# Patient Record
Sex: FEMALE | URBAN - NONMETROPOLITAN AREA
[De-identification: names, ages, dates, MRNs, and addresses within clinical notes are randomized per-mention and may not be internally consistent; named-entity substitution may affect disease eponyms.]

---

## 2018-01-25 ENCOUNTER — OFFICE VISIT (OUTPATIENT)
Dept: MEDICAL GROUP | Facility: CLINIC | Age: 43
End: 2018-01-25
Payer: MEDICAID

## 2018-01-25 ENCOUNTER — NON-PROVIDER VISIT (OUTPATIENT)
Dept: URGENT CARE | Facility: PHYSICIAN GROUP | Age: 43
End: 2018-01-25
Payer: MEDICAID

## 2018-01-25 ENCOUNTER — APPOINTMENT (OUTPATIENT)
Dept: RADIOLOGY | Facility: IMAGING CENTER | Age: 43
End: 2018-01-25
Attending: PHYSICIAN ASSISTANT
Payer: MEDICAID

## 2018-01-25 VITALS
WEIGHT: 270 LBS | SYSTOLIC BLOOD PRESSURE: 130 MMHG | BODY MASS INDEX: 40.92 KG/M2 | RESPIRATION RATE: 16 BRPM | HEIGHT: 68 IN | HEART RATE: 78 BPM | DIASTOLIC BLOOD PRESSURE: 82 MMHG | TEMPERATURE: 98.3 F | OXYGEN SATURATION: 94 %

## 2018-01-25 DIAGNOSIS — J30.89 ALLERGY TO DUST: ICD-10-CM

## 2018-01-25 DIAGNOSIS — Z23 NEED FOR VACCINATION: ICD-10-CM

## 2018-01-25 DIAGNOSIS — Z13.6 SCREENING FOR CARDIOVASCULAR CONDITION: ICD-10-CM

## 2018-01-25 DIAGNOSIS — G89.29 CHRONIC PAIN OF BOTH ANKLES: ICD-10-CM

## 2018-01-25 DIAGNOSIS — M25.571 CHRONIC PAIN OF BOTH ANKLES: ICD-10-CM

## 2018-01-25 DIAGNOSIS — F90.9 ADULT ADHD (ATTENTION DEFICIT HYPERACTIVITY DISORDER): ICD-10-CM

## 2018-01-25 DIAGNOSIS — F41.9 ANXIETY AND DEPRESSION: ICD-10-CM

## 2018-01-25 DIAGNOSIS — M25.572 CHRONIC PAIN OF BOTH ANKLES: ICD-10-CM

## 2018-01-25 DIAGNOSIS — E66.01 MORBID OBESITY WITH BMI OF 40.0-44.9, ADULT (HCC): ICD-10-CM

## 2018-01-25 DIAGNOSIS — E28.2 PCOS (POLYCYSTIC OVARIAN SYNDROME): ICD-10-CM

## 2018-01-25 DIAGNOSIS — F32.A ANXIETY AND DEPRESSION: ICD-10-CM

## 2018-01-25 PROCEDURE — 73620 X-RAY EXAM OF FOOT: CPT | Mod: RT,LT | Performed by: FAMILY MEDICINE

## 2018-01-25 PROCEDURE — 90471 IMMUNIZATION ADMIN: CPT | Performed by: PHYSICIAN ASSISTANT

## 2018-01-25 PROCEDURE — 99204 OFFICE O/P NEW MOD 45 MIN: CPT | Mod: 25 | Performed by: PHYSICIAN ASSISTANT

## 2018-01-25 PROCEDURE — 90686 IIV4 VACC NO PRSV 0.5 ML IM: CPT | Performed by: PHYSICIAN ASSISTANT

## 2018-01-25 RX ORDER — FLUOXETINE HYDROCHLORIDE 20 MG/1
20 CAPSULE ORAL DAILY
Qty: 90 CAP | Refills: 3 | Status: SHIPPED | OUTPATIENT
Start: 2018-01-25

## 2018-01-25 RX ORDER — PARSLEY 450 MG
CAPSULE ORAL
COMMUNITY

## 2018-01-25 RX ORDER — METHYLPHENIDATE HYDROCHLORIDE 20 MG/1
20 CAPSULE, EXTENDED RELEASE ORAL EVERY MORNING
COMMUNITY
End: 2019-06-13

## 2018-01-25 RX ORDER — FLUOXETINE HYDROCHLORIDE 20 MG/1
20 CAPSULE ORAL DAILY
COMMUNITY
End: 2018-01-25 | Stop reason: SDUPTHER

## 2018-01-25 RX ORDER — IBUPROFEN 200 MG
200 TABLET ORAL EVERY 6 HOURS PRN
COMMUNITY

## 2018-01-25 ASSESSMENT — PATIENT HEALTH QUESTIONNAIRE - PHQ9: CLINICAL INTERPRETATION OF PHQ2 SCORE: 0

## 2018-01-25 NOTE — ASSESSMENT & PLAN NOTE
Since moving from [place] to [place], the patient has noted that her allergies have become worse. She states that in the past hydrocortisone topical solution in each ear one drop 3 times per day for 2 days will completely alleviate her symptoms of nasal sinus congestion, ear itching and ear fullness for a couple of weeks. She is requesting this medication today.

## 2018-01-25 NOTE — ASSESSMENT & PLAN NOTE
Patient states that recently she has been gaining weight uncontrollably. She states she has greatly limited the sugar intake in her diet and continues to gain weight. She has not recently had a thyroid or any other blood tests and is requesting this today. Exercises very difficult to squeeze into her busy schedule as she has 4 children.

## 2018-01-25 NOTE — ASSESSMENT & PLAN NOTE
This patient has been taking Ritalin for many years as prescribed by her general practice provider in [place]. She states this medication alleviates her disorganized thought patterns and hyperactive behavior.

## 2018-01-25 NOTE — ASSESSMENT & PLAN NOTE
Since moving from [place] about 3 months ago, the patient has noticed increasing bilateral ankle pain. She states it is worse in the back area of her ankle around the Achilles tendon. She has not had this evaluated. She does not have any swelling or tenderness to palpation however, she states it is worse after sitting for long periods of time and standing up.

## 2018-01-25 NOTE — PROGRESS NOTES
I reviewed foot xrays. You have bone spurs present in both heels which may be the cause of your pain. Please follow up with physical therapy as referred.

## 2018-01-25 NOTE — ASSESSMENT & PLAN NOTE
Patient states in the past few years, she has had uncontrolled weight gain, increase hair growth around her chin and mouth and her nipples and arms. She has developed a fatty lump on her upper back/neck. She continues to have menstrual cycles and has successfully delivered 4 children. She is not constipated and does not have diarrhea. She does not state she has excessive or reduced energy levels.

## 2018-01-25 NOTE — PROGRESS NOTES
Chief Complaint   Patient presents with   • Ankle Pain       HISTORY OF THE PRESENT ILLNESS: This is a 42 y.o. female new patient to our practice who presents today for evaluation and management of:    Adult ADHD (attention deficit hyperactivity disorder)  This patient has been taking Ritalin for many years as prescribed by her general practice provider in [place]. She states this medication alleviates her disorganized thought patterns and hyperactive behavior.    Anxiety and depression  Patient states that her anxiety and depression began after her first pregnancy and childbirth. She states she has been taking Prozac 20 mg capsules daily after every pregnancy and has continued since her now 2-year-old child was born. She feels this keeps her anxiety and depression under good control. She denies suicidal or homicidal ideations at this time.    Chronic pain of both ankles  Since moving from [place] about 3 months ago, the patient has noticed increasing bilateral ankle pain. She states it is worse in the back area of her ankle around the Achilles tendon. She has not had this evaluated. She does not have any swelling or tenderness to palpation however, she states it is worse after sitting for long periods of time and standing up.     Morbid obesity with BMI of 40.0-44.9, adult (HCC)  Patient states that recently she has been gaining weight uncontrollably. She states she has greatly limited the sugar intake in her diet and continues to gain weight. She has not recently had a thyroid or any other blood tests and is requesting this today. Exercises very difficult to squeeze into her busy schedule as she has 4 children.    PCOS (polycystic ovarian syndrome)  Patient states in the past few years, she has had uncontrolled weight gain, increase hair growth around her chin and mouth and her nipples and arms. She has developed a fatty lump on her upper back/neck. She continues to have menstrual cycles and has successfully  delivered 4 children. She is not constipated and does not have diarrhea. She does not state she has excessive or reduced energy levels.    Allergy to dust  Since moving from [place] to [place], the patient has noted that her allergies have become worse. She states that in the past hydrocortisone topical solution in each ear one drop 3 times per day for 2 days will completely alleviate her symptoms of nasal sinus congestion, ear itching and ear fullness for a couple of weeks. She is requesting this medication today.      History reviewed. No pertinent past medical history.    Past Surgical History:   Procedure Laterality Date   • TUBAL LIGATION         Family Status   Relation Status   • Mother Alive   • Father Alive   • Sister Alive   • Brother Alive   • Sister Alive   • Sister Alive     Family History   Problem Relation Age of Onset   • Psychiatry Mother    • Other Mother      fibromyalgia and IBS   • No Known Problems Father    • Psychiatry Sister    • Psychiatry Brother      possible positive suicide attempt   • Alcohol abuse Sister    • Psychiatry Sister        Social History   Substance Use Topics   • Smoking status: Never Smoker   • Smokeless tobacco: Never Used   • Alcohol use No       Allergies: Augmentin and Codeine    Current Outpatient Prescriptions Ordered in [facility]   Medication Sig Dispense Refill   • methylphenidate (RITALIN LA) 20 MG SR capsule Take 20 mg by mouth every morning.     • ibuprofen (MOTRIN) 200 MG Tab Take 200 mg by mouth every 6 hours as needed.     • Ashwagandha 500 MG Cap Take  by mouth.     • fluoxetine (PROZAC) 20 MG Cap Take 1 Cap by mouth every day. 90 Cap 3   • HYDROCORTISONE, TOPICAL, 2.5 % Solution 3 Drops by Apply externally route 3 times a day as needed (itching ear). 30 mL 5     No current [facility]-ordered facility-administered medications on file.      Review of Systems: See history of present illness above.  Constitutional: Negative for fever, chills and malaise/fatigue.  "  HENT: See history of present illness above. Negative for ear pain or tinnitus, nosebleeds,  sore throat and neck pain.    Eyes: Negative for blurred or decreased vision.   Respiratory: Negative for cough, sputum production, shortness of breath and wheezing.    Cardiovascular: Negative for chest pain, palpitations, orthopnea, syncope and leg swelling.   Gastrointestinal: Negative for heartburn, nausea, vomiting and abdominal pain.   Genitourinary: Negative for dysuria, urgency and frequency.   Musculoskeletal: See history of present illness above. Negative for myalgias, back pain.   Skin: See history of present illness above for increased hair growth Negative for rash, itching, nail changes or skin changes.   Neurological: Negative for dizziness, tremors, sensory change, focal weakness, tingling and headaches.   Endo/Heme/Allergies: See history of present illness above. Does not bruise/bleed easily.    Psychiatric/Behavioral: Positive for well-controlled depression. Negative for suicidal ideas and memory loss. The patient is not nervous/anxious and does not have insomnia.  Pt does not use recreational drugs or any alcohol.       Exam:  Blood pressure 130/82, pulse 78, temperature 36.8 °C (98.3 °F), resp. rate 16, height 1.727 m (5' 8\"), weight 122.5 kg (270 lb), SpO2 94 %.   Calculated BMI is 41.05  General:  Obese female in NAD  Eyes: Conjunctiva clear, lids without ptosis, pupils equal and reactive to light accommodation.  ENMT: Nose and lips without deformity. Nasal mucosa and septum pink without evidence of purulent drainage.  Neck: Supple without masses upon palpation. Thyroid is not visibly enlarged although it is slightly enlarged to palpation. Small [place] hump present.  Pulmonary: Normal effort. No rales, ronchi, or wheezing upon auscultation.   Cardiovascular: Regular rate and rhythm without murmur. Carotid and radial pulses are intact and equal bilaterally.   Extremities: No clubbing or cyanosis. " Bilateral upper and lower extremities without edema.   GI: Protuberant abdomen with central obesity. Normoactive bowel sounds in all 4 quadrants. Soft, nontender, nondistended. Without palpable hepatosplenomegaly.   Neurologic: Deep tendon reflexes 2+/4 bilaterally.   Skin: Warm and dry.  No obvious lesions. Patient does have trimmed hair on her chin, upper lip and excessive hair growth on her bilateral forearms for a female.  Musculoskeletal: Normal gait.   Psych: Normal mood and affect. Alert and oriented x3. Judgment and insight is normal.      Medical Decision Making & Discussion:   1. Chronic pain of both ankles  - DX-FOOT-2- LEFT; Future  - DX-FOOT-2- RIGHT; Future  - REFERRAL TO PHYSICAL THERAPY Reason for Therapy: Eval/Treat/Report    2. Morbid obesity with BMI of 40.0-44.9, adult (CMS-[facility])  - Patient identified as having weight management issue.  Appropriate orders and counseling given.  - CMP (12)    3. PCOS (polycystic ovarian syndrome)  Although this diagnosis is uncertain at this time, based on the patient's history it seems this is likely. The one curious aspect is that she has been able to deliver multiple children continues having menstrual cycles.  - TSH+FREE T4  - TESTOSTERONE F&T FEMALES/CHILD; Future  - CORTISOL, SERUM OR PLASMA  - PROLACTIN; Future  - CMP (12)    4. Anxiety and depression  - fluoxetine (PROZAC) 20 MG Cap; Take 1 Cap by mouth every day.  Dispense: 90 Cap; Refill: 3  - CMP (12)    5. Adult ADHD (attention deficit hyperactivity disorder)  Patient was advised to see a psychiatrist per her Ritalin prescription. After she has been seen by psychiatry for 2 or 3 visits, I am happy to take over prescription of this medication once I have received record of multiple psychiatry visits without a medication change.  - REFERRAL TO PSYCHIATRY    6. Need for vaccination  - Flu Quad Inj >3 Year Pre-Filled PF    7. Allergy to dust  - HYDROCORTISONE, TOPICAL, 2.5 % Solution; 3 Drops by Apply  externally route 3 times a day as needed (itching ear).  Dispense: 30 mL; Refill: 5    8. Screening for cardiovascular condition  - LIPID PANEL        Please note that this dictation was created using voice recognition software. I have made every reasonable attempt to correct obvious errors, but I expect that there are errors of grammar and possibly content that I did not discover before finalizing the note.    Return in about 4 weeks (around 2/22/2018) for PCOS and lab follow up.

## 2018-01-25 NOTE — ASSESSMENT & PLAN NOTE
Patient states that her anxiety and depression began after her first pregnancy and childbirth. She states she has been taking Prozac 20 mg capsules daily after every pregnancy and has continued since her now 2-year-old child was born. She feels this keeps her anxiety and depression under good control. She denies suicidal or homicidal ideations at this time.

## 2018-01-26 ENCOUNTER — HOSPITAL ENCOUNTER (OUTPATIENT)
Facility: MEDICAL CENTER | Age: 43
End: 2018-01-26
Attending: PHYSICIAN ASSISTANT
Payer: MEDICAID

## 2018-01-26 ENCOUNTER — NON-PROVIDER VISIT (OUTPATIENT)
Dept: MEDICAL GROUP | Facility: CLINIC | Age: 43
End: 2018-01-26
Payer: MEDICAID

## 2018-01-26 DIAGNOSIS — E28.2 PCOS (POLYCYSTIC OVARIAN SYNDROME): ICD-10-CM

## 2018-01-26 DIAGNOSIS — Z01.89 ROUTINE LAB DRAW: ICD-10-CM

## 2018-01-26 LAB
ALBUMIN SERPL BCP-MCNC: 4.6 G/DL (ref 3.2–4.9)
ALBUMIN/GLOB SERPL: 1.6 G/DL
ALP SERPL-CCNC: 70 U/L (ref 30–99)
ALT SERPL-CCNC: 10 U/L (ref 2–50)
ANION GAP SERPL CALC-SCNC: 7 MMOL/L (ref 0–11.9)
AST SERPL-CCNC: 18 U/L (ref 12–45)
BILIRUB SERPL-MCNC: 1 MG/DL (ref 0.1–1.5)
BUN SERPL-MCNC: 14 MG/DL (ref 8–22)
CALCIUM SERPL-MCNC: 8.8 MG/DL (ref 8.5–10.5)
CHLORIDE SERPL-SCNC: 101 MMOL/L (ref 96–112)
CHOLEST SERPL-MCNC: 137 MG/DL (ref 100–199)
CO2 SERPL-SCNC: 27 MMOL/L (ref 20–33)
CORTIS SERPL-MCNC: 13.2 UG/DL (ref 0–23)
CREAT SERPL-MCNC: 0.75 MG/DL (ref 0.5–1.4)
GLOBULIN SER CALC-MCNC: 2.8 G/DL (ref 1.9–3.5)
GLUCOSE SERPL-MCNC: 90 MG/DL (ref 65–99)
HDLC SERPL-MCNC: 37 MG/DL
LDLC SERPL CALC-MCNC: 60 MG/DL
POTASSIUM SERPL-SCNC: 4.5 MMOL/L (ref 3.6–5.5)
PROLACTIN SERPL-MCNC: 4.97 NG/ML (ref 2.8–26)
PROT SERPL-MCNC: 7.4 G/DL (ref 6–8.2)
SODIUM SERPL-SCNC: 135 MMOL/L (ref 135–145)
T4 FREE SERPL-MCNC: 0.75 NG/DL (ref 0.53–1.43)
TRIGL SERPL-MCNC: 201 MG/DL (ref 0–149)
TSH SERPL DL<=0.005 MIU/L-ACNC: 1.47 UIU/ML (ref 0.38–5.33)

## 2018-01-26 PROCEDURE — 82533 TOTAL CORTISOL: CPT

## 2018-01-26 PROCEDURE — 99000 SPECIMEN HANDLING OFFICE-LAB: CPT | Performed by: NURSE PRACTITIONER

## 2018-01-26 PROCEDURE — 84403 ASSAY OF TOTAL TESTOSTERONE: CPT

## 2018-01-26 PROCEDURE — 84270 ASSAY OF SEX HORMONE GLOBUL: CPT

## 2018-01-26 PROCEDURE — 80053 COMPREHEN METABOLIC PANEL: CPT

## 2018-01-26 PROCEDURE — 84443 ASSAY THYROID STIM HORMONE: CPT

## 2018-01-26 PROCEDURE — 36415 COLL VENOUS BLD VENIPUNCTURE: CPT | Performed by: NURSE PRACTITIONER

## 2018-01-26 PROCEDURE — 84146 ASSAY OF PROLACTIN: CPT

## 2018-01-26 PROCEDURE — 80061 LIPID PANEL: CPT

## 2018-01-26 PROCEDURE — 84439 ASSAY OF FREE THYROXINE: CPT

## 2018-01-31 ENCOUNTER — TELEPHONE (OUTPATIENT)
Dept: MEDICAL GROUP | Facility: CLINIC | Age: 43
End: 2018-01-31

## 2018-01-31 LAB
SHBG SERPL-SCNC: 28 NMOL/L (ref 30–135)
TESTOST FREE SERPL-MCNC: 4.5 PG/ML (ref 1.3–9.2)
TESTOST SERPL-MCNC: 25 NG/DL (ref 9–55)

## 2018-01-31 NOTE — TELEPHONE ENCOUNTER
Pt notified of results. She's wondering if you recommend going back on birth control or are there any recommendations for controlling or regularizing hormone levels ? she's curious about spironolactone or potentially metformin ?

## 2018-01-31 NOTE — PROGRESS NOTES
I reviewed labs. Your hormone tests are only slightly abnormal and at this point are inconclusive regarding PCOS. If you had a free testosterone level that was in the upper limit of normal or elevated I would be more convinced that this is what is causing your symptoms. However, with only one factor that points to PCOS, we should be a bit more cautious in concluding that this is the problem.

## 2018-01-31 NOTE — TELEPHONE ENCOUNTER
----- Message from Dunia Lee P.A.-C. sent at 1/31/2018  8:24 AM PST -----  I reviewed labs. Your hormone tests are only slightly abnormal and at this point are inconclusive regarding PCOS. If you had a free testosterone level that was in the upper limit of normal or elevated I would be more convinced that this is what is causing your symptoms. However, with only one factor that points to PCOS, we should be a bit more cautious in concluding that this is the problem.

## 2018-02-02 NOTE — TELEPHONE ENCOUNTER
Dunia Lee P.A.-C.   Smitha Abad; [facility] 22 hours ago (1:07 PM)      This patient should return for a follow up visit to discuss.  (Routing comment)      Pt. With apt. On 2/27/18

## 2018-02-27 ENCOUNTER — OFFICE VISIT (OUTPATIENT)
Dept: MEDICAL GROUP | Facility: CLINIC | Age: 43
End: 2018-02-27
Payer: MEDICAID

## 2018-02-27 VITALS
HEART RATE: 79 BPM | BODY MASS INDEX: 40.92 KG/M2 | OXYGEN SATURATION: 99 % | TEMPERATURE: 99.2 F | SYSTOLIC BLOOD PRESSURE: 130 MMHG | HEIGHT: 68 IN | RESPIRATION RATE: 16 BRPM | WEIGHT: 270 LBS | DIASTOLIC BLOOD PRESSURE: 84 MMHG

## 2018-02-27 DIAGNOSIS — L68.0 EXCESSIVE HAIR ON FEMALES: ICD-10-CM

## 2018-02-27 DIAGNOSIS — E66.01 MORBID OBESITY WITH BMI OF 40.0-44.9, ADULT (HCC): ICD-10-CM

## 2018-02-27 PROBLEM — E28.2 PCOS (POLYCYSTIC OVARIAN SYNDROME): Status: RESOLVED | Noted: 2018-01-25 | Resolved: 2018-02-27

## 2018-02-27 PROCEDURE — 99213 OFFICE O/P EST LOW 20 MIN: CPT | Performed by: PHYSICIAN ASSISTANT

## 2018-02-27 NOTE — ASSESSMENT & PLAN NOTE
Patient states that recently she has been gaining weight uncontrollably. She states she has greatly limited the sugar intake in her diet and continues to gain weight. Exercise is very difficult to squeeze into her busy schedule as she has 4 children. She has recently started dieting to reduce her calorie intake. Her Thyroid, cortisol and all other lab tests were wnl except a sightly low sex hormone binding globulin which is not indicative of any particular disease process.

## 2018-02-27 NOTE — PROGRESS NOTES
Chief Complaint   Patient presents with   • Ankle Pain       HISTORY OF PRESENT ILLNESS: Patient is a 42 y.o. female established patient who presents today for evaluation and management of:    Morbid obesity with BMI of 40.0-44.9, adult ([facility])  Patient states that recently she has been gaining weight uncontrollably. She states she has greatly limited the sugar intake in her diet and continues to gain weight. Exercise is very difficult to squeeze into her busy schedule as she has 4 children. She has recently started dieting to reduce her calorie intake. Her Thyroid, cortisol and all other lab tests were wnl except a sightly low sex hormone binding globulin which is not indicative of any particular disease process.     Excessive hair on females  Patient continues to have excessive hair growth on her chin that she is worried about.        Patient Active Problem List    Diagnosis Date Noted   • Excessive hair on females 02/27/2018   • Chronic pain of both ankles 01/25/2018   • Morbid obesity with BMI of 40.0-44.9, adult ([facility]) 01/25/2018   • Anxiety and depression 01/25/2018   • Adult ADHD (attention deficit hyperactivity disorder) 01/25/2018   • Allergy to dust 01/25/2018       Allergies:Augmentin and Codeine    Current Outpatient Prescriptions   Medication Sig Dispense Refill   • methylphenidate (RITALIN LA) 20 MG SR capsule Take 20 mg by mouth every morning.     • Ashwagandha 500 MG Cap Take  by mouth.     • fluoxetine (PROZAC) 20 MG Cap Take 1 Cap by mouth every day. 90 Cap 3   • ibuprofen (MOTRIN) 200 MG Tab Take 200 mg by mouth every 6 hours as needed.     • HYDROCORTISONE, TOPICAL, 2.5 % Solution 3 Drops by Apply externally route 3 times a day as needed (itching ear). 30 mL 5     No current facility-administered medications for this visit.        Social History   Substance Use Topics   • Smoking status: Never Smoker   • Smokeless tobacco: Never Used   • Alcohol use No       Family Status   Relation Status   •  "Mother Alive   • Father Alive   • Sister Alive   • Brother Alive   • Sister Alive   • Sister Alive   • Child Alive   • Child Alive   • Child Alive   • Child Alive     Family History   Problem Relation Age of Onset   • Psychiatry Mother    • Other Mother      fibromyalgia and IBS   • No Known Problems Father    • Psychiatry Sister    • Psychiatry Brother      possible positive suicide attempt   • Alcohol abuse Sister    • Psychiatry Sister    • Depression Child    • ADD / ADHD Child        Review of Systems:   Constitutional: Negative for fever, chills, weight loss and positive for fatigue.   HENT: Negative for ear pain, nosebleeds, congestion, sore throat and neck pain.    Eyes: Negative for blurred vision.   Respiratory: Negative for cough, sputum production, shortness of breath and wheezing.    Cardiovascular: Negative for chest pain, palpitations, orthopnea and leg swelling.   Gastrointestinal: Negative for heartburn, nausea, vomiting and abdominal pain.   Genitourinary: Negative for dysuria, urgency and frequency.   Musculoskeletal: Negative for myalgias, back pain.  Skin: Negative for rash and itching.   Neurological: Negative for dizziness, tingling, tremors, sensory change, focal weakness and headaches.   Endo/Heme/Allergies: Does not bruise/bleed easily.   Psychiatric/Behavioral: Negative for depression, suicidal ideas and memory loss.  The patient is not nervous/anxious and does not have insomnia.      Exam:  Blood pressure 130/84, pulse 79, temperature 37.3 °C (99.2 °F), resp. rate 16, height 1.727 m (5' 8\"), weight 122.5 kg (270 lb), SpO2 99 %.  Body mass index is 41.05 kg/m².  General:  Obese female in NAD  Head: is grossly normal.  Neck: Supple without masses. Thyroid is not visibly enlarged.  Pulmonary: Clear to ausculation. Normal effort. No rales, ronchi, or wheezing.  Cardiovascular: Regular rate and rhythm without murmur. Carotid and radial pulses are intact and equal bilaterally.  Extremities: no " clubbing, cyanosis, or edema.    Medical decision-making and discussion:  1. Excessive hair on females  Paticaren was advised that there is no current clear cause for this except that her normal female hormones are declining with age.     2. Morbid obesity with BMI of 40.0-44.9, adult (HCC)  Patient advised for at least 10 minutes regarding how to reduce calories and lose weight.       Please note that this dictation was created using voice recognition software. I have made every reasonable attempt to correct obvious errors, but I expect that there are errors of grammar and possibly content that I did not discover before finalizing the note.      Return if symptoms worsen or fail to improve.

## 2018-10-30 ENCOUNTER — OFFICE VISIT (OUTPATIENT)
Dept: URGENT CARE | Facility: PHYSICIAN GROUP | Age: 43
End: 2018-10-30
Payer: MEDICAID

## 2018-10-30 VITALS
HEIGHT: 68 IN | BODY MASS INDEX: 38.95 KG/M2 | TEMPERATURE: 97.7 F | HEART RATE: 63 BPM | RESPIRATION RATE: 16 BRPM | SYSTOLIC BLOOD PRESSURE: 140 MMHG | DIASTOLIC BLOOD PRESSURE: 80 MMHG | OXYGEN SATURATION: 97 % | WEIGHT: 257 LBS

## 2018-10-30 DIAGNOSIS — J02.9 SORE THROAT: ICD-10-CM

## 2018-10-30 DIAGNOSIS — J06.9 URI WITH COUGH AND CONGESTION: ICD-10-CM

## 2018-10-30 LAB
INT CON NEG: NORMAL
INT CON POS: NORMAL
S PYO AG THROAT QL: NEGATIVE

## 2018-10-30 PROCEDURE — 87880 STREP A ASSAY W/OPTIC: CPT | Performed by: PHYSICIAN ASSISTANT

## 2018-10-30 PROCEDURE — 99204 OFFICE O/P NEW MOD 45 MIN: CPT | Performed by: PHYSICIAN ASSISTANT

## 2018-10-30 RX ORDER — FLUTICASONE PROPIONATE 50 MCG
1 SPRAY, SUSPENSION (ML) NASAL 2 TIMES DAILY
Qty: 1 BOTTLE | Refills: 0 | Status: SHIPPED | OUTPATIENT
Start: 2018-10-30 | End: 2019-02-19 | Stop reason: SDUPTHER

## 2018-10-30 NOTE — PROGRESS NOTES
Chief Complaint   Patient presents with   • Cough       HISTORY OF PRESENT ILLNESS: Patient is a 42 y.o. female who presents today for the following:    Cough x 3 weeks off and on  Will go away for a few days then come back  Most recently, sx's started about 2 days ago  + Lymph nodes are swollen, ST, body aches, fever  1 kid in school  OTC meds today: APAP, ibuprofen, last dosed about 3 hours PTA     Patient Active Problem List    Diagnosis Date Noted   • Excessive hair on females 02/27/2018   • Chronic pain of both ankles 01/25/2018   • Morbid obesity with BMI of 40.0-44.9, adult ([facility]) 01/25/2018   • Anxiety and depression 01/25/2018   • Adult ADHD (attention deficit hyperactivity disorder) 01/25/2018   • Allergy to dust 01/25/2018       Allergies:Augmentin and Codeine    Current Outpatient Prescriptions Ordered in [facility]   Medication Sig Dispense Refill   • fluticasone (FLONASE) 50 MCG/ACT nasal spray Spray 1 Spray in nose 2 times a day. 1 Bottle 0   • methylphenidate (RITALIN LA) 20 MG SR capsule Take 20 mg by mouth every morning.     • ibuprofen (MOTRIN) 200 MG Tab Take 200 mg by mouth every 6 hours as needed.     • Ashwagandha 500 MG Cap Take  by mouth.     • fluoxetine (PROZAC) 20 MG Cap Take 1 Cap by mouth every day. 90 Cap 3   • HYDROCORTISONE, TOPICAL, 2.5 % Solution 3 Drops by Apply externally route 3 times a day as needed (itching ear). 30 mL 5     No current [facility]-ordered facility-administered medications on file.        No past medical history on file.    Social History   Substance Use Topics   • Smoking status: Never Smoker   • Smokeless tobacco: Never Used   • Alcohol use No       Family Status   Relation Status   • Mo Alive   • Fa Alive   • Sis Alive   • Bro Alive   • Sis Alive   • Sis Alive   • Child Alive   • Child Alive   • Child Alive   • Child Alive     Family History   Problem Relation Age of Onset   • Psychiatry Mother    • Other Mother         fibromyalgia and IBS   • No Known Problems  "Father    • Psychiatry Sister    • Psychiatry Brother         possible positive suicide attempt   • Alcohol abuse Sister    • Psychiatry Sister    • Depression Child    • ADD / ADHD Child        Review of Systems:   Constitutional ROS: No unexpected change in weight, No weakness, No fatigue  Eye ROS: No recent significant change in vision, No eye pain, redness, discharge  Ear ROS: No drainage, No tinnitus or vertigo, No recent change in hearing  Mouth/Throat ROS: No teeth or gum problems, No bleeding gums, No tongue complaints  Neck ROS: No swollen glands, No significant pain in neck  Pulmonary ROS: No chronic cough, sputum, or hemoptysis, No dyspnea on exertion, No wheezing  Cardiovascular ROS: No diaphoresis, No edema, No palpitations  Gastrointestinal ROS: No change in bowel habits, No significant change in appetite, No nausea, vomiting, diarrhea, or constipation  Musculoskeletal/Extremities ROS: No peripheral edema, No pain, redness or swelling on the joints  Skin/Integumentary ROS: No edema, No evidence of rash, No itching      Exam:  Blood pressure 140/80, pulse 63, temperature 36.5 °C (97.7 °F), resp. rate 16, height 1.727 m (5' 8\"), weight 116.6 kg (257 lb), SpO2 97 %.  General: Well developed, well nourished. No distress.  Nontoxic in appearance.  Eye: PERRL/EOMI; conjunctivae clear, lids normal.  ENMT: Lips without lesions, MMM. Oropharynx is clear. Bilateral TMs are within normal limits but with clear fluid posteriorly bilaterally.  Pulmonary: Unlabored respiratory effort. Lungs clear to auscultation, no wheezes, no rhonchi.  Cardiovascular: Regular rate and rhythm without murmur.   Neurologic: Grossly nonfocal. No facial asymmetry noted.  Lymph: Mildly tender cervical lymphadenopathy noted bilaterally.  Skin: Warm, dry, good turgor. No rashes in visible areas.   Psych: Normal mood. Alert and oriented x3. Judgment and insight is normal.    Rapid strep: Negative    Assessment/Plan:  Discussed likely viral " etiology. Discussed appropriate over-the-counter symptomatic medication, and when to return to clinic.  Use all medication as directed.  Follow-up for worsening or persistent symptoms.  1. URI with cough and congestion  fluticasone (FLONASE) 50 MCG/ACT nasal spray   2. Sore throat  POCT Rapid Strep A

## 2018-11-13 ENCOUNTER — OFFICE VISIT (OUTPATIENT)
Dept: URGENT CARE | Facility: PHYSICIAN GROUP | Age: 43
End: 2018-11-13
Payer: MEDICAID

## 2018-11-13 VITALS
WEIGHT: 259.4 LBS | SYSTOLIC BLOOD PRESSURE: 138 MMHG | HEART RATE: 75 BPM | RESPIRATION RATE: 16 BRPM | BODY MASS INDEX: 39.44 KG/M2 | OXYGEN SATURATION: 97 % | TEMPERATURE: 99.7 F | DIASTOLIC BLOOD PRESSURE: 82 MMHG

## 2018-11-13 DIAGNOSIS — Z20.818 STREP THROAT EXPOSURE: ICD-10-CM

## 2018-11-13 LAB
INT CON NEG: NORMAL
INT CON POS: NORMAL
S PYO AG THROAT QL: NEGATIVE

## 2018-11-13 PROCEDURE — 87880 STREP A ASSAY W/OPTIC: CPT | Performed by: PHYSICIAN ASSISTANT

## 2018-11-13 PROCEDURE — 99212 OFFICE O/P EST SF 10 MIN: CPT | Performed by: PHYSICIAN ASSISTANT

## 2018-11-13 NOTE — LETTER
November 13, 2018         Patient: Josefina Calvin   YOB: 1975   Date of Visit: 11/13/2018            To Whom it May Concern:    Josefina Calvin was seen in my clinic on 11/13/2018. She may return to work on 11/15/18.    If you have any questions or concerns, please don't hesitate to call.        Sincerely,           Ev Acevedo P.A.-C.  Electronically Signed

## 2018-11-13 NOTE — PROGRESS NOTES
Chief Complaint   Patient presents with   • Pharyngitis       HISTORY OF PRESENT ILLNESS: Patient is a 42 y.o. female who presents today for the following:    Patient comes in for evaluation of possible strep throat.  Patient had exposure to somebody who was positive for strep.  She complains of very mild body aches and a headache over the last several days.  She states she was feeling a lot worse last week but does continue to have mild symptoms.  She denies any fever, night sweats, chills.  She has not taken any over-the-counter medication today.    Patient Active Problem List    Diagnosis Date Noted   • Excessive hair on females 02/27/2018   • Chronic pain of both ankles 01/25/2018   • Morbid obesity with BMI of 40.0-44.9, adult (HCC) 01/25/2018   • Anxiety and depression 01/25/2018   • Adult ADHD (attention deficit hyperactivity disorder) 01/25/2018   • Allergy to dust 01/25/2018       Allergies:Augmentin and Codeine    Current Outpatient Prescriptions Ordered in [facility]   Medication Sig Dispense Refill   • fluticasone (FLONASE) 50 MCG/ACT nasal spray Spray 1 Spray in nose 2 times a day. 1 Bottle 0   • methylphenidate (RITALIN LA) 20 MG SR capsule Take 20 mg by mouth every morning.     • ibuprofen (MOTRIN) 200 MG Tab Take 200 mg by mouth every 6 hours as needed.     • Ashwagandha 500 MG Cap Take  by mouth.     • fluoxetine (PROZAC) 20 MG Cap Take 1 Cap by mouth every day. 90 Cap 3   • HYDROCORTISONE, TOPICAL, 2.5 % Solution 3 Drops by Apply externally route 3 times a day as needed (itching ear). 30 mL 5     No current [facility]-ordered facility-administered medications on file.        No past medical history on file.    Social History   Substance Use Topics   • Smoking status: Never Smoker   • Smokeless tobacco: Never Used   • Alcohol use No       Family Status   Relation Status   • Mo Alive   • Fa Alive   • Sis Alive   • Bro Alive   • Sis Alive   • Sis Alive   • Child Alive   • Child Alive   • Child Alive   • Child  Alive     Family History   Problem Relation Age of Onset   • Psychiatry Mother    • Other Mother         fibromyalgia and IBS   • No Known Problems Father    • Psychiatry Sister    • Psychiatry Brother         possible positive suicide attempt   • Alcohol abuse Sister    • Psychiatry Sister    • Depression Child    • ADD / ADHD Child        Review of Systems:  Constitutional ROS: No unexpected change in weight, No weakness, No fatigue  Eye ROS: No recent significant change in vision, No eye pain, redness, discharge  Ear ROS: No drainage, No tinnitus or vertigo, No recent change in hearing  Mouth/Throat ROS: No teeth or gum problems, No bleeding gums, No tongue complaints  Neck ROS: No swollen glands, No significant pain in neck  Pulmonary ROS: No chronic cough, sputum, or hemoptysis, No dyspnea on exertion, No wheezing  Cardiovascular ROS: No diaphoresis, No edema, No palpitations  Gastrointestinal ROS: No change in bowel habits, No significant change in appetite, No nausea, vomiting, diarrhea, or constipation  Musculoskeletal/Extremities ROS: No peripheral edema, No pain, redness or swelling on the joints  Skin/Integumentary ROS: No edema, No evidence of rash, No itching      Exam:  Blood pressure 138/82, pulse 75, temperature 37.6 °C (99.7 °F), resp. rate 16, weight 117.7 kg (259 lb 6.4 oz), SpO2 97 %.  General: Well developed, well nourished. No distress.  Eye: PERRL/EOMI; conjunctivae clear, lids normal.  ENMT: Lips without lesions, MMM. Oropharynx is clear. Bilateral TMs are within normal limits.  Pulmonary: Unlabored respiratory effort. Lungs clear to auscultation, no wheezes, no rhonchi.  Cardiovascular: Regular rate and rhythm without murmur.    Neurologic: Grossly nonfocal. No facial asymmetry noted.  Lymph: No cervical lymphadenopathy noted.  Skin: Warm, dry, good turgor. No rashes in visible areas.   Psych: Normal mood. Alert and oriented x3. Judgment and insight is normal.    Rapid strep:  Negative    Assessment/Plan:  Discussed likely viral etiology. Discussed appropriate over-the-counter symptomatic medication, and when to return to clinic.  Follow-up for worsening or persistent symptoms.  1. Strep throat exposure  POCT Rapid Strep A

## 2018-11-15 ENCOUNTER — OFFICE VISIT (OUTPATIENT)
Dept: URGENT CARE | Facility: PHYSICIAN GROUP | Age: 43
End: 2018-11-15
Payer: MEDICAID

## 2018-11-15 VITALS
TEMPERATURE: 97.9 F | SYSTOLIC BLOOD PRESSURE: 126 MMHG | WEIGHT: 259 LBS | OXYGEN SATURATION: 97 % | HEART RATE: 72 BPM | DIASTOLIC BLOOD PRESSURE: 90 MMHG | HEIGHT: 68 IN | RESPIRATION RATE: 16 BRPM | BODY MASS INDEX: 39.25 KG/M2

## 2018-11-15 DIAGNOSIS — J02.0 STREP THROAT: ICD-10-CM

## 2018-11-15 DIAGNOSIS — J02.9 SORE THROAT: ICD-10-CM

## 2018-11-15 DIAGNOSIS — B37.9 ANTIBIOTIC-INDUCED YEAST INFECTION: ICD-10-CM

## 2018-11-15 DIAGNOSIS — T36.95XA ANTIBIOTIC-INDUCED YEAST INFECTION: ICD-10-CM

## 2018-11-15 LAB
INT CON NEG: NEGATIVE
INT CON POS: POSITIVE
S PYO AG THROAT QL: POSITIVE

## 2018-11-15 PROCEDURE — 99214 OFFICE O/P EST MOD 30 MIN: CPT | Performed by: PHYSICIAN ASSISTANT

## 2018-11-15 PROCEDURE — 87880 STREP A ASSAY W/OPTIC: CPT | Performed by: PHYSICIAN ASSISTANT

## 2018-11-15 RX ORDER — AZITHROMYCIN 250 MG/1
TABLET, FILM COATED ORAL
Qty: 6 TAB | Refills: 0 | Status: SHIPPED | OUTPATIENT
Start: 2018-11-15 | End: 2019-03-26

## 2018-11-15 RX ORDER — FLUCONAZOLE 150 MG/1
150 TABLET ORAL DAILY
Qty: 1 TAB | Refills: 0 | Status: SHIPPED | OUTPATIENT
Start: 2018-11-15 | End: 2019-03-26

## 2018-11-15 NOTE — PROGRESS NOTES
Chief Complaint   Patient presents with   • Pharyngitis       HISTORY OF PRESENT ILLNESS: Patient is a 42 y.o. female who presents today because she has a sore throat, feels achy.  Her symptoms have been waxing and waning for the last couple months but over the last several days she has gotten much worse with a sore throat.  Recently her oldest son was diagnosed with strep positive pharyngitis, she had a recent strep test that was negative.  She has been using over-the-counter Tylenol without improvement    Patient Active Problem List    Diagnosis Date Noted   • Excessive hair on females 02/27/2018   • Chronic pain of both ankles 01/25/2018   • Morbid obesity with BMI of 40.0-44.9, adult ([facility]) 01/25/2018   • Anxiety and depression 01/25/2018   • Adult ADHD (attention deficit hyperactivity disorder) 01/25/2018   • Allergy to dust 01/25/2018       Allergies:Augmentin and Codeine    Current Outpatient Prescriptions Ordered in [facility]   Medication Sig Dispense Refill   • azithromycin (ZITHROMAX) 250 MG Tab Follow package directions 6 Tab 0   • fluticasone (FLONASE) 50 MCG/ACT nasal spray Spray 1 Spray in nose 2 times a day. 1 Bottle 0   • methylphenidate (RITALIN LA) 20 MG SR capsule Take 20 mg by mouth every morning.     • ibuprofen (MOTRIN) 200 MG Tab Take 200 mg by mouth every 6 hours as needed.     • Ashwagandha 500 MG Cap Take  by mouth.     • fluoxetine (PROZAC) 20 MG Cap Take 1 Cap by mouth every day. 90 Cap 3   • HYDROCORTISONE, TOPICAL, 2.5 % Solution 3 Drops by Apply externally route 3 times a day as needed (itching ear). 30 mL 5     No current [facility]-ordered facility-administered medications on file.        No past medical history on file.    Social History   Substance Use Topics   • Smoking status: Never Smoker   • Smokeless tobacco: Never Used   • Alcohol use No       Family Status   Relation Status   • Mo Alive   • Fa Alive   • Sis Alive   • Bro Alive   • Sis Alive   • Sis Alive   • Child Alive   • Child Alive  "  • Child Alive   • Child Alive     Family History   Problem Relation Age of Onset   • Psychiatry Mother    • Other Mother         fibromyalgia and IBS   • No Known Problems Father    • Psychiatry Sister    • Psychiatry Brother         possible positive suicide attempt   • Alcohol abuse Sister    • Psychiatry Sister    • Depression Child    • ADD / ADHD Child        ROS:  Review of Systems   Constitutional: Negative for fever, chills, weight loss and positive for malaise/fatigue.   HENT: Negative for ear pain, nosebleeds, congestion, positive for sore throat and neck pain.    Eyes: Negative for blurred vision.   Respiratory: Negative for cough, sputum production, shortness of breath and wheezing.    Cardiovascular: Negative for chest pain, palpitations, orthopnea and leg swelling.   Gastrointestinal: Negative for heartburn, nausea, vomiting and abdominal pain.   Genitourinary: Negative for dysuria, urgency and frequency.     Exam:  Blood pressure 126/90, pulse 72, temperature 36.6 °C (97.9 °F), temperature source Temporal, resp. rate 16, height 1.727 m (5' 8\"), weight 117.5 kg (259 lb), SpO2 97 %.  General:  Well nourished, well developed female in NAD  Head:Normocephalic, atraumatic  Eyes: PERRLA, EOM within normal limits, no conjunctival injection, no scleral icterus, visual fields and acuity grossly intact.  Ears: Normal shape and symmetry, no tenderness, no discharge. External canals are without any significant edema or erythema. Tympanic membranes are without any inflammation, no effusion. Gross auditory acuity is intact  Nose: Symmetrical without tenderness, no discharge.  Mouth: reasonable hygiene, she has pharyngeal arch erythema without exudates or tonsillar enlargement.  Neck: There is mild bilateral anterior cervical lymph node enlargement and tenderness, range of motion within normal limits, no tracheal deviation. No obvious thyroid enlargement.  Pulmonary: chest is symmetrical with respiration, no " wheezes, crackles, or rhonchi.  Cardiovascular: regular rate and rhythm without murmurs, rubs, or gallops.  Extremities: no clubbing, cyanosis, or edema.    Strep test is positive    Please note that this dictation was created using voice recognition software. I have made every reasonable attempt to correct obvious errors, but I expect that there are errors of grammar and possibly content that I did not discover before finalizing the note.    Assessment/Plan:  1. Strep throat  azithromycin (ZITHROMAX) 250 MG Tab   2. Sore throat  POCT Rapid Strep A   Tylenol or ibuprofen as tolerated.    Followup with primary care in the next 7-10 days if not significantly improving, return to the urgent care or go to the emergency room sooner for any worsening of symptoms.

## 2019-02-19 ENCOUNTER — OFFICE VISIT (OUTPATIENT)
Dept: MEDICAL GROUP | Facility: CLINIC | Age: 44
End: 2019-02-19
Payer: MEDICAID

## 2019-02-19 VITALS
HEIGHT: 69 IN | DIASTOLIC BLOOD PRESSURE: 84 MMHG | OXYGEN SATURATION: 98 % | BODY MASS INDEX: 38.51 KG/M2 | TEMPERATURE: 99.2 F | HEART RATE: 72 BPM | SYSTOLIC BLOOD PRESSURE: 164 MMHG | WEIGHT: 260 LBS | RESPIRATION RATE: 16 BRPM

## 2019-02-19 DIAGNOSIS — F41.9 ANXIETY AND DEPRESSION: ICD-10-CM

## 2019-02-19 DIAGNOSIS — F32.A ANXIETY AND DEPRESSION: ICD-10-CM

## 2019-02-19 DIAGNOSIS — R79.89 ABNORMAL LEVEL OF FEMALE SEX HORMONES: ICD-10-CM

## 2019-02-19 DIAGNOSIS — L68.0 EXCESSIVE HAIR ON FEMALES: ICD-10-CM

## 2019-02-19 DIAGNOSIS — I10 ESSENTIAL HYPERTENSION: ICD-10-CM

## 2019-02-19 DIAGNOSIS — E66.09 CLASS 2 OBESITY DUE TO EXCESS CALORIES WITHOUT SERIOUS COMORBIDITY WITH BODY MASS INDEX (BMI) OF 38.0 TO 38.9 IN ADULT: ICD-10-CM

## 2019-02-19 DIAGNOSIS — J30.89 ALLERGY TO DUST: ICD-10-CM

## 2019-02-19 DIAGNOSIS — J06.9 URI WITH COUGH AND CONGESTION: ICD-10-CM

## 2019-02-19 PROCEDURE — 99214 OFFICE O/P EST MOD 30 MIN: CPT | Performed by: PHYSICIAN ASSISTANT

## 2019-02-19 RX ORDER — FLUTICASONE PROPIONATE 50 MCG
1 SPRAY, SUSPENSION (ML) NASAL 2 TIMES DAILY
Qty: 1 BOTTLE | Refills: 0 | Status: SHIPPED | OUTPATIENT
Start: 2019-02-19 | End: 2019-05-23 | Stop reason: SDUPTHER

## 2019-02-19 NOTE — ASSESSMENT & PLAN NOTE
This is a recent problem, having appeared after patient started an adrenal support herbal supplement. Ingredients were researched today and many of the ingredients seem to be stimulants. Patient denies headache, blurry vision or symptoms of elevated blood pressure but does feel she has extra energy at this time.

## 2019-02-19 NOTE — PROGRESS NOTES
"Chief Complaint   Patient presents with   • Blood Pressure Problem   • Allergic Reaction       HISTORY OF PRESENT ILLNESS: Patient is a 43 y.o. female established patient who presents today for evaluation and management of:    Excessive hair on females  Patient continues to have excessive hair growth on her chin that she is worried about.     Class 2 obesity due to excess calories with body mass index (BMI) of 38.0 to 38.9 in adult  Recently, after following the \"weight watchers\" diet this patient states she has lost 6 great deal of weight however, she remains the same weight as she was approximately 3 months ago.    Anxiety and depression  This is a chronic condition that remains well controlled on 10 mg Prozac every other day.  Patient continues seeing psychiatry for this problem.  She continues to deny suicidal and homicidal occasions although she states she is somewhat stressed out at this time.    Allergy to dust  Patient continues to experience allergy to dust and pollens and believes that her allergies have recently become worse.  She recently stopped consuming gluten and feels that she had a reduction of her overall inflammation and allergy at this time.  She has resumed consuming gluten recently and states her allergies have gotten worse.  She has no recent allergy blood test on file and is requesting this at this time.  She states that the combination of Claritin and Flonase work well to control her allergies.  She is requesting Flonase refills today.    Essential hypertension  This is a recent problem, having appeared after patient started an adrenal support herbal supplement. Ingredients were researched today and many of the ingredients seem to be stimulants. Patient denies headache, blurry vision or symptoms of elevated blood pressure but does feel she has extra energy at this time.        Patient Active Problem List    Diagnosis Date Noted   • Essential hypertension 02/19/2019   • Excessive hair on " females 02/27/2018   • Chronic pain of both ankles 01/25/2018   • Class 2 obesity due to excess calories with body mass index (BMI) of 38.0 to 38.9 in adult 01/25/2018   • Anxiety and depression 01/25/2018   • Adult ADHD (attention deficit hyperactivity disorder) 01/25/2018   • Allergy to dust 01/25/2018       Allergies:Augmentin and Codeine    Current Outpatient Prescriptions   Medication Sig Dispense Refill   • fluticasone (FLONASE) 50 MCG/ACT nasal spray Spray 1 Spray in nose 2 times a day. 1 Bottle 0   • azithromycin (ZITHROMAX) 250 MG Tab Follow package directions 6 Tab 0   • fluconazole (DIFLUCAN) 150 MG tablet Take 1 Tab by mouth every day. 1 Tab 0   • methylphenidate (RITALIN LA) 20 MG SR capsule Take 20 mg by mouth every morning.     • ibuprofen (MOTRIN) 200 MG Tab Take 200 mg by mouth every 6 hours as needed.     • Ashwagandha 500 MG Cap Take  by mouth.     • fluoxetine (PROZAC) 20 MG Cap Take 1 Cap by mouth every day. 90 Cap 3   • HYDROCORTISONE, TOPICAL, 2.5 % Solution 3 Drops by Apply externally route 3 times a day as needed (itching ear). 30 mL 5     No current facility-administered medications for this visit.        Social History   Substance Use Topics   • Smoking status: Never Smoker   • Smokeless tobacco: Never Used   • Alcohol use No       Family Status   Relation Status   • Mo Alive   • Fa Alive   • Sis Alive   • Bro Alive   • Sis Alive   • Sis Alive   • Child Alive   • Child Alive   • Child Alive   • Child Alive     Family History   Problem Relation Age of Onset   • Psychiatry Mother    • Other Mother         fibromyalgia and IBS   • No Known Problems Father    • Psychiatry Sister    • Psychiatry Brother         possible positive suicide attempt   • Alcohol abuse Sister    • Psychiatry Sister    • Depression Child    • ADD / ADHD Child        Review of Systems: See HPI above.   Constitutional: Negative for fever, chills, weight loss and malaise.   HENT: Negative for ear pain, nosebleeds,  "congestion, sore throat and neck pain.    Eyes: Negative for blurred vision.   Respiratory: Negative for shortness of breath, cough, sputum production and wheezing.    Cardiovascular: Negative for chest pain, palpitations, orthopnea and leg swelling.   Gastrointestinal: Negative for heartburn, nausea, vomiting and abdominal pain.   Genitourinary: Negative for dysuria, urgency and frequency.   Musculoskeletal: Negative for myalgias, back pain and positive for right foot pain.   Skin: Negative for rash and itching.   Neurological: Negative for dizziness, tingling, tremors, sensory change, focal weakness and headaches.   Endo/Heme/Allergies: Does not bruise/bleed easily.   Psychiatric/Behavioral: positive for well-controlled depression without suicidal ideas and memory loss.  The patient is not nervous/anxious and does not have insomnia.      Exam:  Blood pressure (!) 164/84, pulse 72, temperature 37.3 °C (99.2 °F), temperature source Temporal, resp. rate 16, height 1.753 m (5' 9\"), weight 117.9 kg (260 lb), last menstrual period 02/05/2019, SpO2 98 %, not currently breastfeeding.  Body mass index is 38.4 kg/m².  General:  Obese female in NAD  Head: is grossly normal.  Neck: Supple without masses. Thyroid is not visibly enlarged.  Pulmonary: Clear to ausculation. Normal effort. No rales, ronchi, or wheezing.  Cardiovascular: Regular rate and rhythm without murmur. Carotid pulses are intact and equal bilaterally.  Extremities: no clubbing, cyanosis, or edema.    Medical decision-making and discussion:  1. Allergy to dust  flonase renewed.   - fluticasone (FLONASE) 50 MCG/ACT nasal spray; Spray 1 Spray in nose 2 times a day.  Dispense: 1 Bottle; Refill: 0    2. Anxiety and depression    - PROLACTIN; Future  - TESTOSTERONE F&T FEMALES/CHILD; Future  - ESTIMATED GFR; Future  - FREE THYROXINE; Future  - TSH; Future  - Lipid Profile; Future  - CMP (12)  - FOOD ALLERGY PANEL  - CORTISOL; Future    3. 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 Excessive hair on females    - PROLACTIN; Future  - TESTOSTERONE F&T FEMALES/CHILD; Future  - ESTIMATED GFR; Future  - FREE THYROXINE; Future  - TSH; Future  - Lipid Profile; Future  - CMP (12)  - FOOD ALLERGY PANEL  - CORTISOL; Future    5. Essential hypertension  Discussed stopping herbal supplement, increasing exercise and decreasing salt intake.     6. Class 2 obesity due to excess calories without serious comorbidity with body mass index (BMI) of 38.0 to 38.9 in adult    - PROLACTIN; Future  - TESTOSTERONE F&T FEMALES/CHILD; Future  - ESTIMATED GFR; Future  - FREE THYROXINE; Future  - TSH; Future  - Lipid Profile; Future  - CMP (12)  - FOOD ALLERGY PANEL  - CORTISOL; Future        Please note that this dictation was created using voice recognition software. I have made every reasonable attempt to correct obvious errors, but I expect that there are errors of grammar and possibly content that I did not discover before finalizing the note.      Return in about 4 weeks (around 3/19/2019) for blood pressure.

## 2019-02-19 NOTE — ASSESSMENT & PLAN NOTE
This is a chronic condition that remains well controlled on 10 mg Prozac every other day.  Patient continues seeing psychiatry for this problem.  She continues to deny suicidal and homicidal occasions although she states she is somewhat stressed out at this time.

## 2019-02-19 NOTE — ASSESSMENT & PLAN NOTE
"Recently, after following the \"weight watchers\" diet this patient states she has lost 6 great deal of weight however, she remains the same weight as she was approximately 3 months ago.  "

## 2019-02-19 NOTE — ASSESSMENT & PLAN NOTE
Patient continues to experience allergy to dust and pollens and believes that her allergies have recently become worse.  She recently stopped consuming gluten and feels that she had a reduction of her overall inflammation and allergy at this time.  She has resumed consuming gluten recently and states her allergies have gotten worse.  She has no recent allergy blood test on file and is requesting this at this time.  She states that the combination of Claritin and Flonase work well to control her allergies.  She is requesting Flonase refills today.

## 2019-03-05 ENCOUNTER — NON-PROVIDER VISIT (OUTPATIENT)
Dept: MEDICAL GROUP | Facility: CLINIC | Age: 44
End: 2019-03-05
Payer: MEDICAID

## 2019-03-05 ENCOUNTER — HOSPITAL ENCOUNTER (OUTPATIENT)
Facility: MEDICAL CENTER | Age: 44
End: 2019-03-05
Attending: PHYSICIAN ASSISTANT
Payer: MEDICAID

## 2019-03-05 DIAGNOSIS — F32.A ANXIETY AND DEPRESSION: ICD-10-CM

## 2019-03-05 DIAGNOSIS — F41.9 ANXIETY AND DEPRESSION: ICD-10-CM

## 2019-03-05 DIAGNOSIS — E66.09 CLASS 2 OBESITY DUE TO EXCESS CALORIES WITHOUT SERIOUS COMORBIDITY WITH BODY MASS INDEX (BMI) OF 38.0 TO 38.9 IN ADULT: ICD-10-CM

## 2019-03-05 DIAGNOSIS — L68.0 EXCESSIVE HAIR ON FEMALES: ICD-10-CM

## 2019-03-05 LAB
ALBUMIN SERPL BCP-MCNC: 4.2 G/DL (ref 3.2–4.9)
ALBUMIN/GLOB SERPL: 1.2 G/DL
ALP SERPL-CCNC: 62 U/L (ref 30–99)
ALT SERPL-CCNC: 13 U/L (ref 2–50)
ANION GAP SERPL CALC-SCNC: 7 MMOL/L (ref 0–11.9)
AST SERPL-CCNC: 18 U/L (ref 12–45)
BILIRUB SERPL-MCNC: 0.6 MG/DL (ref 0.1–1.5)
BUN SERPL-MCNC: 14 MG/DL (ref 8–22)
CALCIUM SERPL-MCNC: 9.3 MG/DL (ref 8.5–10.5)
CHLORIDE SERPL-SCNC: 104 MMOL/L (ref 96–112)
CHOLEST SERPL-MCNC: 146 MG/DL (ref 100–199)
CO2 SERPL-SCNC: 26 MMOL/L (ref 20–33)
CREAT SERPL-MCNC: 0.71 MG/DL (ref 0.5–1.4)
GLOBULIN SER CALC-MCNC: 3.5 G/DL (ref 1.9–3.5)
GLUCOSE SERPL-MCNC: 90 MG/DL (ref 65–99)
HDLC SERPL-MCNC: 37 MG/DL
LDLC SERPL CALC-MCNC: 69 MG/DL
POTASSIUM SERPL-SCNC: 4.2 MMOL/L (ref 3.6–5.5)
PROT SERPL-MCNC: 7.7 G/DL (ref 6–8.2)
SODIUM SERPL-SCNC: 137 MMOL/L (ref 135–145)
T4 FREE SERPL-MCNC: 0.87 NG/DL (ref 0.53–1.43)
TRIGL SERPL-MCNC: 198 MG/DL (ref 0–149)
TSH SERPL DL<=0.005 MIU/L-ACNC: 2.16 UIU/ML (ref 0.38–5.33)

## 2019-03-05 PROCEDURE — 80053 COMPREHEN METABOLIC PANEL: CPT

## 2019-03-05 PROCEDURE — 36415 COLL VENOUS BLD VENIPUNCTURE: CPT | Performed by: FAMILY MEDICINE

## 2019-03-05 PROCEDURE — 80061 LIPID PANEL: CPT

## 2019-03-05 PROCEDURE — 84146 ASSAY OF PROLACTIN: CPT

## 2019-03-05 PROCEDURE — 84439 ASSAY OF FREE THYROXINE: CPT

## 2019-03-05 PROCEDURE — 82533 TOTAL CORTISOL: CPT

## 2019-03-05 PROCEDURE — 84443 ASSAY THYROID STIM HORMONE: CPT

## 2019-03-05 PROCEDURE — 99000 SPECIMEN HANDLING OFFICE-LAB: CPT | Performed by: FAMILY MEDICINE

## 2019-03-06 LAB
CORTIS SERPL-MCNC: 9.6 UG/DL (ref 0–23)
PROLACTIN SERPL-MCNC: 5.54 NG/ML (ref 2.8–26)

## 2019-03-18 ENCOUNTER — HOSPITAL ENCOUNTER (OUTPATIENT)
Dept: LAB | Facility: MEDICAL CENTER | Age: 44
End: 2019-03-18
Attending: PHYSICIAN ASSISTANT
Payer: MEDICAID

## 2019-03-18 DIAGNOSIS — R79.89 ABNORMAL LEVEL OF FEMALE SEX HORMONES: ICD-10-CM

## 2019-03-18 DIAGNOSIS — F32.A ANXIETY AND DEPRESSION: ICD-10-CM

## 2019-03-18 DIAGNOSIS — L68.0 EXCESSIVE HAIR ON FEMALES: ICD-10-CM

## 2019-03-18 DIAGNOSIS — F41.9 ANXIETY AND DEPRESSION: ICD-10-CM

## 2019-03-18 DIAGNOSIS — E66.09 CLASS 2 OBESITY DUE TO EXCESS CALORIES WITHOUT SERIOUS COMORBIDITY WITH BODY MASS INDEX (BMI) OF 38.0 TO 38.9 IN ADULT: ICD-10-CM

## 2019-03-18 PROCEDURE — 84403 ASSAY OF TOTAL TESTOSTERONE: CPT

## 2019-03-18 PROCEDURE — 36415 COLL VENOUS BLD VENIPUNCTURE: CPT

## 2019-03-18 PROCEDURE — 84270 ASSAY OF SEX HORMONE GLOBUL: CPT

## 2019-03-18 PROCEDURE — 86003 ALLG SPEC IGE CRUDE XTRC EA: CPT | Mod: 91

## 2019-03-21 LAB
ALMOND IGE QN: <0.1 KU/L
ASPARAGUS IGE QN: <0.1 KU/L
AVOCADO IGE QN: <0.1 KU/L
BAKER'S YEAST IGE QN: <0.1 KU/L
BANANA IGE QN: <0.1 KU/L
BASIL IGE QN: <0.1 KU/L
BAYLEAF IGE QN: <0.1 KU/L
BEEF IGE QN: <0.1 KU/L
BEET IGE QN: <0.1 KU/L
BELL PEPPER IGE QN: <0.1 KU/L
BLACK PEPPER IGE QN: <0.1 KU/L
BLUE MUSSEL IGE QN: <0.1 KU/L
BLUEBERRY IGE QN: <0.1 KU/L
BRAZIL NUT IGE QN: <0.1 KU/L
BROCCOLI IGE QN: <0.1 KU/L
BUCKWHEAT IGE QN: <0.1 KU/L
CABBAGE IGE QN: <0.1 KU/L
CARROT IGE QN: <0.1 KU/L
CASHEW NUT IGE QN: <0.1 KU/L
CHESTNUT IGE QN: <0.1 KU/L
CHICKPEA IGE AB [UNITS/VOLUME] IN SERUM: <0.1 KU/L
CHOCOLATE IGE QN: <0.1 KU/L
CINNAMON IGE QN: <0.1 KU/L
CLAM IGE QN: <0.1 KU/L
COCONUT IGE QN: <0.1 KU/L
CODFISH IGE QN: <0.1 KU/L
COW MILK IGE QN: <0.1 KU/L
CRAB IGE QN: <0.1 KU/L
CUCUMBER IGE QN: <0.1 KU/L
CULTIVATED COTTON IGE QN: <0.1 KU/L
DEPRECATED MISC ALLERGEN IGE RAST QL: NORMAL
DILL IGE QN: <0.1 KU/L
EGG WHITE IGE QN: <0.1 KU/L
GINGER IGE QN: <0.1 KU/L
GRAPE IGE QN: <0.1 KU/L
HALIBUT IGE QN: <0.1 KU/L
HAZELNUT IGE QN: <0.1 KU/L
LETTUCE IGE QN: <0.1 KU/L
LIMA BEAN IGE QN: <0.1 KU/L
MELON IGE QN: <0.1 KU/L
ONION IGE QN: <0.1 KU/L
ORANGE IGE QN: <0.1 KU/L
OREGANO IGE QN: <0.1 KU/L
PEA IGE QN: <0.1 KU/L
PEANUT IGE QN: <0.1 KU/L
PEAR IGE QN: <0.1 KU/L
PLUM IGE QN: <0.1 KU/L
PORK IGE QN: <0.1 KU/L
POTATO IGE QN: <0.1 KU/L
RASPBERRY IGE QN: <0.1 KU/L
SESAME SEED IGE QN: <0.1 KU/L
SHRIMP IGE QN: <0.1 KU/L
SOYBEAN IGE QN: <0.1 KU/L
TEA IGE QN: <0.1 KU/L
TOMATO IGE QN: <0.1 KU/L
TROUT IGE QN: <0.1 KU/L
TURKEY MEAT IGE QN: <0.1 KU/L
WALNUT IGE QN: <0.1 KU/L
WATERMELON IGE QN: <0.1 KU/L

## 2019-03-22 LAB
SHBG SERPL-SCNC: 28 NMOL/L (ref 30–135)
TESTOST FREE SERPL-MCNC: 5.1 PG/ML (ref 1.1–5.8)
TESTOST SERPL-MCNC: 28 NG/DL (ref 9–55)

## 2019-03-25 ENCOUNTER — TELEPHONE (OUTPATIENT)
Dept: MEDICAL GROUP | Facility: CLINIC | Age: 44
End: 2019-03-25

## 2019-03-25 NOTE — TELEPHONE ENCOUNTER
----- Message from Dunia Lee P.A.-C. sent at 3/25/2019  9:42 AM PDT -----  I reviewed labs. Everything looks good. Return for follow up on 3/26/2019

## 2019-03-25 NOTE — TELEPHONE ENCOUNTER
Phone Number Called: [phone] (home)       Message: Told Pt that Sana  reviewed her labs. Everything looks good. Return for follow up on 3/26/2019    Left Message for patient to call back: no

## 2019-03-26 ENCOUNTER — OFFICE VISIT (OUTPATIENT)
Dept: MEDICAL GROUP | Facility: CLINIC | Age: 44
End: 2019-03-26
Payer: MEDICAID

## 2019-03-26 VITALS
SYSTOLIC BLOOD PRESSURE: 140 MMHG | WEIGHT: 264.6 LBS | HEART RATE: 74 BPM | OXYGEN SATURATION: 98 % | BODY MASS INDEX: 39.19 KG/M2 | HEIGHT: 69 IN | DIASTOLIC BLOOD PRESSURE: 82 MMHG | TEMPERATURE: 99.8 F | RESPIRATION RATE: 16 BRPM

## 2019-03-26 DIAGNOSIS — E66.09 CLASS 2 OBESITY DUE TO EXCESS CALORIES WITHOUT SERIOUS COMORBIDITY WITH BODY MASS INDEX (BMI) OF 38.0 TO 38.9 IN ADULT: ICD-10-CM

## 2019-03-26 DIAGNOSIS — R79.89 ABNORMAL LEVEL OF FEMALE SEX HORMONES: ICD-10-CM

## 2019-03-26 DIAGNOSIS — I10 ESSENTIAL HYPERTENSION: ICD-10-CM

## 2019-03-26 DIAGNOSIS — E58 CALCIUM DEFICIENCY: ICD-10-CM

## 2019-03-26 DIAGNOSIS — E61.2 MAGNESIUM DEFICIENCY: ICD-10-CM

## 2019-03-26 PROBLEM — F55.1: Status: ACTIVE | Noted: 2019-03-26

## 2019-03-26 PROCEDURE — 99213 OFFICE O/P EST LOW 20 MIN: CPT | Performed by: PHYSICIAN ASSISTANT

## 2019-03-26 RX ORDER — LISINOPRIL 20 MG/1
20 TABLET ORAL DAILY
Qty: 30 TAB | Refills: 2 | Status: SHIPPED | OUTPATIENT
Start: 2019-03-26 | End: 2019-06-27 | Stop reason: SDUPTHER

## 2019-03-26 RX ORDER — UBIDECARENONE 75 MG
100 CAPSULE ORAL DAILY
COMMUNITY

## 2019-03-26 RX ORDER — METHYLPHENIDATE HYDROCHLORIDE EXTENDED RELEASE 20 MG/1
TABLET ORAL
Refills: 0 | COMMUNITY
Start: 2019-03-11

## 2019-03-26 NOTE — PROGRESS NOTES
Chief Complaint   Patient presents with   • Results     FV    • Hypertension       HISTORY OF PRESENT ILLNESS: Patient is a 43 y.o. female established patient who presents today for evaluation and management of:    Class 2 obesity due to excess calories with body mass index (BMI) of 38.0 to 38.9 in adult  This patient has gained 5 lbs over the past month since stopping her ADD medicine.     Essential hypertension  This is a recent problem, having appeared after patient started an adrenal support herbal supplement and somewhat improving since stopping this supplement. Ingredients were researched  and many of the ingredients were stimulants. Patient denies headache, blurry vision or symptoms of elevated blood pressure but does feel she has lost the extra energy she had while on the supplement.        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     • Calcium-Mag-Vit C-Vit D 185-28-2.5-200 Cap Take  by mouth.     • lisinopril (PRINIVIL) 20 MG Tab Take 1 Tab by mouth every day. 30 Tab 2   • fluticasone (FLONASE) 50 MCG/ACT nasal spray Spray 1 Spray in nose 2 times a day. 1 Bottle 0   • methylphenidate (RITALIN LA) 20 MG SR capsule Take 20 mg by mouth every morning.     • fluoxetine (PROZAC) 20 MG Cap Take 1 Cap by mouth every day. 90 Cap 3   • methylphenidate (METADATE ER) 20 MG CR tablet   0   • ibuprofen (MOTRIN) 200 MG  Tab Take 200 mg by mouth every 6 hours as needed.     • Ashwagandha 500 MG Cap Take  by mouth.     • HYDROCORTISONE, TOPICAL, 2.5 % Solution 3 Drops by Apply externally route 3 times a day as needed (itching ear). 30 mL 5     No current facility-administered medications for this visit.        Social History   Substance Use Topics   • Smoking status: Never Smoker   • Smokeless tobacco: Never Used   • Alcohol use No       Family Status   Relation Status   • Mo Alive   • Fa Alive   • Sis Alive   • Bro Alive   • Sis Alive   • Sis Alive   • Child Alive   • Child Alive   • Child Alive   • Child Alive     Family History   Problem Relation Age of Onset   • Psychiatry Mother    • Other Mother         fibromyalgia and IBS   • No Known Problems Father    • Psychiatry Sister    • Psychiatry Brother         possible positive suicide attempt   • Alcohol abuse Sister    • Psychiatry Sister    • Depression Child    • ADD / ADHD Child        Review of Systems: See HPI above.   Constitutional: Negative for fever, chills, weight loss and malaise. Positive for fatigue.    HENT: Negative for ear pain, nosebleeds, congestion, sore throat and neck pain.    Eyes: Negative for blurred vision.   Respiratory: Negative for shortness of breath, cough, sputum production and wheezing.    Cardiovascular: Negative for chest pain, palpitations, orthopnea and leg swelling.   Gastrointestinal: Negative for heartburn, nausea, vomiting and abdominal pain.   Genitourinary: Negative for dysuria, urgency and frequency.   Musculoskeletal: Negative for myalgias, back pain and joint pain.   Skin: Negative for rash and itching.   Neurological: Negative for dizziness, tingling, tremors, sensory change, focal weakness and headaches.   Endo/Heme/Allergies: Does not bruise/bleed easily.   Psychiatric/Behavioral: Negative for depression, suicidal ideas and memory loss.  The patient is not nervous/anxious and does not have insomnia.      Exam:  Blood pressure  "140/82, pulse 74, temperature 37.7 °C (99.8 °F), temperature source Temporal, resp. rate 16, height 1.753 m (5' 9\"), weight 120 kg (264 lb 9.6 oz), SpO2 98 %, not currently breastfeeding.  Body mass index is 39.07 kg/m².  General: morbidly, centrally Obese female in NAD  Head: is grossly normal.  Neck: Supple without masses. Thyroid is not visibly enlarged. No buffalo hump.   Pulmonary: Normal effort.   Cardiovascular: Regular rate and rhythm without murmur. Carotid pulses are intact and equal bilaterally.   Extremities: no clubbing, cyanosis, or edema.    Medical decision-making and discussion:  1. Class 2 obesity due to excess calories without serious comorbidity with body mass index (BMI) of 38.0 to 38.9 in adult  Advised to exercise. Patient still not doing this.     2. Essential hypertension  advised regarding diet and lifestyle.   - lisinopril (PRINIVIL) 20 MG Tab; Take 1 Tab by mouth every day.  Dispense: 30 Tab; Refill: 2    3. Calcium deficiency    - CALCIUM (CA); Future    4. Magnesium deficiency    - MAGNESIUM    5. 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 I have made every reasonable attempt to correct obvious errors, but I expect that there are errors of grammar and possibly content that I did not discover before finalizing the note.      Return for as needed. .  "

## 2019-04-07 ENCOUNTER — OFFICE VISIT (OUTPATIENT)
Dept: URGENT CARE | Facility: PHYSICIAN GROUP | Age: 44
End: 2019-04-07
Payer: MEDICAID

## 2019-04-07 VITALS
DIASTOLIC BLOOD PRESSURE: 78 MMHG | WEIGHT: 266 LBS | RESPIRATION RATE: 14 BRPM | HEIGHT: 69 IN | SYSTOLIC BLOOD PRESSURE: 122 MMHG | HEART RATE: 88 BPM | BODY MASS INDEX: 39.4 KG/M2 | TEMPERATURE: 98.7 F | OXYGEN SATURATION: 97 %

## 2019-04-07 DIAGNOSIS — H01.001 BLEPHARITIS OF RIGHT UPPER EYELID, UNSPECIFIED TYPE: ICD-10-CM

## 2019-04-07 PROCEDURE — 99213 OFFICE O/P EST LOW 20 MIN: CPT | Performed by: PHYSICIAN ASSISTANT

## 2019-04-07 NOTE — PROGRESS NOTES
Chief Complaint   Patient presents with   • Eye Problem     R eyelid swollen x2d       HISTORY OF PRESENT ILLNESS: Patient is a 43 y.o. female who presents today for the following:    Patient comes in for evaluation of eyelid swelling over the last 2 days.  She denies ocular symptoms including pain, irritation, redness, and exudate.  She reports mild soreness of the right upper eyelid.  She denies history of the same.  She has not tried any over-the-counter medication.    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   • Essential hypertension 02/19/2019   • Excessive hair on females 02/27/2018   • Chronic pain of both ankles 01/25/2018   • Class 2 obesity due to excess calories with body mass index (BMI) of 38.0 to 38.9 in adult 01/25/2018   • Anxiety and depression 01/25/2018   • Adult ADHD (attention deficit hyperactivity disorder) 01/25/2018   • Allergy to dust 01/25/2018       Allergies:Augmentin and Codeine    Current Outpatient Prescriptions Ordered in [facility]   Medication Sig Dispense Refill   • methylphenidate (METADATE ER) 20 MG CR tablet   0   • cyanocobalamin (VITAMIN B-12) 100 MCG Tab Take 100 mcg by mouth every day.     • Calcium-Mag-Vit C-Vit D 185-28-2.5-200 Cap Take  by mouth.     • lisinopril (PRINIVIL) 20 MG Tab Take 1 Tab by mouth every day. 30 Tab 2   • fluticasone (FLONASE) 50 MCG/ACT nasal spray Spray 1 Spray in nose 2 times a day. 1 Bottle 0   • methylphenidate (RITALIN LA) 20 MG SR capsule Take 20 mg by mouth every morning.     • ibuprofen (MOTRIN) 200 MG Tab Take 200 mg by mouth every 6 hours as needed.     • Ashwagandha 500 MG Cap Take  by mouth.     • fluoxetine (PROZAC) 20 MG Cap Take 1 Cap by mouth every day. 90 Cap 3   • HYDROCORTISONE, TOPICAL, 2.5 % Solution 3 Drops by Apply externally route 3 times a day as needed (itching ear). 30 mL 5     No current [facility]-ordered facility-administered medications on file.        No past medical  "history on file.    Social History   Substance Use Topics   • Smoking status: Never Smoker   • Smokeless tobacco: Never Used   • Alcohol use No       Family Status   Relation Status   • Mo Alive   • Fa Alive   • Sis Alive   • Bro Alive   • Sis Alive   • Sis Alive   • Child Alive   • Child Alive   • Child Alive   • Child Alive     Family History   Problem Relation Age of Onset   • Psychiatry Mother    • Other Mother         fibromyalgia and IBS   • No Known Problems Father    • Psychiatry Sister    • Psychiatry Brother         possible positive suicide attempt   • Alcohol abuse Sister    • Psychiatry Sister    • Depression Child    • ADD / ADHD Child        Review of Systems:    Constitutional ROS: No unexpected change in weight, No weakness, No fatigue  Eye ROS: Right upper eyelid swelling.  Ear ROS: No drainage, No tinnitus or vertigo, No recent change in hearing  Mouth/Throat ROS: No teeth or gum problems, No bleeding gums, No tongue complaints  Neck ROS: No swollen glands, No significant pain in neck  Pulmonary ROS: No chronic cough, sputum, or hemoptysis, No dyspnea on exertion, No wheezing  Cardiovascular ROS: No diaphoresis, No edema, No palpitations  Gastrointestinal ROS: No change in bowel habits, No significant change in appetite, No nausea, vomiting, diarrhea, or constipation  Musculoskeletal/Extremities ROS: No peripheral edema, No pain, redness or swelling on the joints  Hematologic/Lymphatic ROS: No chills, No night sweats, No weight loss  Skin/Integumentary ROS: No edema, No evidence of rash, No itching      Exam:  /78   Pulse 88   Temp 37.1 °C (98.7 °F) (Temporal)   Resp 14   Ht 1.753 m (5' 9\")   Wt 120.7 kg (266 lb)   SpO2 97%   General: Well developed, well nourished. No distress.  Eye: PERRL/EOMI; conjunctivae clear.  Trace edema and erythema noted of the right upper eyelid without localized tenderness or nodules.  Pulmonary: Unlabored respiratory effort.    Neurologic: Grossly " nonfocal. No facial asymmetry noted.  Skin: Warm, dry, good turgor. No rashes in visible areas.   Psych: Normal mood. Alert and oriented x3. Judgment and insight is normal.    Assessment/Plan:  Recommend gentle massage of the right upper eyelid with a warm washcloth and baby shampoo.  Discussed does not typically require antibiotics and usually self resolves.  Follow-up for any worsening symptoms.  1. Blepharitis of right upper eyelid, unspecified type

## 2019-05-23 ENCOUNTER — SUPERVISING PHYSICIAN REVIEW (OUTPATIENT)
Dept: MEDICAL GROUP | Facility: CLINIC | Age: 44
End: 2019-05-23

## 2019-05-23 ENCOUNTER — OFFICE VISIT (OUTPATIENT)
Dept: MEDICAL GROUP | Facility: CLINIC | Age: 44
End: 2019-05-23
Payer: MEDICAID

## 2019-05-23 VITALS
DIASTOLIC BLOOD PRESSURE: 82 MMHG | BODY MASS INDEX: 40.32 KG/M2 | WEIGHT: 266 LBS | OXYGEN SATURATION: 93 % | RESPIRATION RATE: 14 BRPM | HEART RATE: 76 BPM | HEIGHT: 68 IN | SYSTOLIC BLOOD PRESSURE: 126 MMHG | TEMPERATURE: 97.5 F

## 2019-05-23 DIAGNOSIS — E66.01 MORBID OBESITY WITH BMI OF 40.0-44.9, ADULT (HCC): ICD-10-CM

## 2019-05-23 DIAGNOSIS — D22.9 CHANGE IN COLOR OF SKIN MOLE: ICD-10-CM

## 2019-05-23 DIAGNOSIS — Z80.8 FAMILY HISTORY OF SKIN CANCER: ICD-10-CM

## 2019-05-23 DIAGNOSIS — Z12.39 SCREENING FOR BREAST CANCER: ICD-10-CM

## 2019-05-23 PROBLEM — E66.09 CLASS 2 OBESITY DUE TO EXCESS CALORIES WITH BODY MASS INDEX (BMI) OF 38.0 TO 38.9 IN ADULT: Status: RESOLVED | Noted: 2018-01-25 | Resolved: 2019-05-23

## 2019-05-23 PROCEDURE — 99213 OFFICE O/P EST LOW 20 MIN: CPT | Performed by: NURSE PRACTITIONER

## 2019-05-23 RX ORDER — FLUTICASONE PROPIONATE 50 MCG
1 SPRAY, SUSPENSION (ML) NASAL 2 TIMES DAILY
Qty: 1 BOTTLE | Refills: 5 | Status: SHIPPED | OUTPATIENT
Start: 2019-05-23

## 2019-05-23 NOTE — PROGRESS NOTES
Subjective:     Josefina Calvin is a 43 y.o. female here today for evaluation and management the following:    Change in color of skin mole  This is a new problem.  Patient reports several of the moles on her body have changed in color, one on her upper back left, and also a new mole on the back of her ear.  She reports that she does diligently use sunscreen.  She does have a family history of skin cancer.  She has been followed by dermatologist in the past but has not for several years.  Patient is concerned and would like referral to dermatologist.       Current medicines (including changes today)  Current Outpatient Prescriptions   Medication Sig Dispense Refill   • fluticasone (FLONASE) 50 MCG/ACT nasal spray Spray 1 Spray in nose 2 times a day. 1 Bottle 5   • methylphenidate (METADATE ER) 20 MG CR tablet   0   • cyanocobalamin (VITAMIN B-12) 100 MCG Tab Take 100 mcg by mouth every day.     • Calcium-Mag-Vit C-Vit D 185-28-2.5-200 Cap Take  by mouth.     • lisinopril (PRINIVIL) 20 MG Tab Take 1 Tab by mouth every day. 30 Tab 2   • methylphenidate (RITALIN LA) 20 MG SR capsule Take 20 mg by mouth every morning.     • ibuprofen (MOTRIN) 200 MG Tab Take 200 mg by mouth every 6 hours as needed.     • Ashwagandha 500 MG Cap Take  by mouth.     • fluoxetine (PROZAC) 20 MG Cap Take 1 Cap by mouth every day. 90 Cap 3   • HYDROCORTISONE, TOPICAL, 2.5 % Solution 3 Drops by Apply externally route 3 times a day as needed (itching ear). 30 mL 5     No current facility-administered medications for this visit.        She  has a past medical history of Anxiety and Depression.    She  has a past surgical history that includes tubal ligation.     Social History     Social History   • Marital status: Unknown     Spouse name: N/A   • Number of children: N/A   • Years of education: N/A     Social History Main Topics   • Smoking status: Never Smoker   • Smokeless tobacco: Never Used   • Alcohol use No   • Drug use: No   •  "Sexual activity: Yes     Partners: Male     Birth control/ protection: Surgical     Other Topics Concern   • Not on file     Social History Narrative   • No narrative on file       Family History   Problem Relation Age of Onset   • Psychiatry Mother    • Other Mother         fibromyalgia and IBS   • No Known Problems Father    • Psychiatry Sister    • Psychiatry Brother         possible positive suicide attempt   • Alcohol abuse Sister    • Psychiatry Sister    • Depression Child    • ADD / ADHD Child          ROS  Positive for skin change.   No fever, no chest pain, no shortness of breath, no abdominal pain, no rashes    All other systems reviewed and are negative.        Objective:     /82 (BP Location: Left arm, Patient Position: Sitting, BP Cuff Size: Large adult)   Pulse 76   Temp 36.4 °C (97.5 °F) (Temporal)   Resp 14   Ht 1.727 m (5' 8\")   Wt 120.7 kg (266 lb)   SpO2 93%  Body mass index is 40.45 kg/m².    Physical Exam:   Constitutional: Alert, no distress.  Eye: Equal, round and reactive, conjunctiva clear, lids normal.   ENMT: Lips without lesions, oropharynx clear.   Neck: Trachea midline, no masses, no thyromegaly. No cervical or supraclavicular lymphadenopathy  Respiratory: Unlabored respiratory effort, lungs clear to auscultation, no wheezes, no ronchi.  Cardiovascular: Normal S1, S2, no murmur, no edema.   Abdomen: Soft, non-tender, no masses, no hepatosplenomegaly. Normal bowel sounds.   Skin: Warm, dry, good turgor, no rashes in visible areas.  Several benign-appearing moles on her upper trunk, arms, back, chest.  There is one mole on top left that appears to have some darker pigmentation in the middle of the mole, symmetrical.  There is also a mole on the back of her left ear.  She reports this is new.  Also reports a nonhealing lesion on her right earlobe which was a mole previously.  She reports itchiness.   Psych: Alert and oriented x3, normal affect and mood.        Assessment and " Plan:   The following treatment plan was discussed    1. Change in color of skin mole  Given her family history and recent mole changes, I have placed a referral for dermatology.  - REFERRAL TO DERMATOLOGY    2. Family history of skin cancer  - REFERRAL TO DERMATOLOGY    3. Morbid obesity with BMI of 40.0-44.9, adult (HCC)  - Patient identified as having weight management issue.  Appropriate orders and counseling given.    4. Screening for breast cancer  - MA-SCREEN MAMMO W/CAD-BILAT; Future    Patient request a refill of Flonase, I have done this for her as well.  Encourage patient to complete Pap smear.  She will follow-up with her PCP.    Reviewed indication, dosage, usage and potential adverse effects of prescribed medications. Patient appears to understand, verbalizes understanding and is willing to try medications as prescribed.      Reviewed risks and benefits of treatment plan. Patient verbally agrees to plan of care.       Followup: Return for Follow up with PCP, schedule PAP .    ZBIGNIEW Felix.     I have reviewed and agree with history, assessment and plan for office encounter on 5/23/19 with Advanced Practice Provider: Jeramie KNOX.  Face to face encounter/direct observation: No  Suggested changes to plan or follow-up: No   Anish Arciniega M.D.      PLEASE NOTE: This dictation was created using voice recognition software. I have made every reasonable attempt to correct obvious errors, but I expect that there may be errors of grammar and possibly content that I did not discover prior finalizing this note.

## 2019-05-23 NOTE — PATIENT INSTRUCTIONS
Your medical care was provided today by: ABILIO Fortune    Thank You for the opportunity to serve you.    You may receive a brief survey in the mail shortly regarding your visit today. Please take a few moments to complete the survey and return it; no postage is necessary. We are working to serve our patient population better, improve customer service and our patients overall experience and your input can help us to accomplish this. We thank you for your help and for the opportunity to serve you today and in the future.     Labs and Diagnostic Testing   Please note that if we have ordered labs or diagnostic testing, those results may be released to you on [employer] prior to my review. While we do our best to review your results as soon as possible, there are times when you may see your results prior to provider review. Of course, if you ever have any questions or concerns about your results, please contact our clinic.     Special Instructions:  Always call 9-1-1 immediately if you develop a life threatening emergency.    Unless told otherwise please take all medications as directed and complete prescription therapies.     Watch for the following signs that require additional evaluation: progressive lethargy or unresponsiveness, localized pain (chest, abdomen), shortness of breath, painful breathing, progressive vomiting with weakness, bloody stools, or new rash.     If you are prescribed pain medication or any other medication that is sedating, do not take medication before or while operating a vehicle or heavy machinery or equipment due to potential side effects such as drowsiness and/or dizziness.

## 2019-05-23 NOTE — ASSESSMENT & PLAN NOTE
This is a new problem.  Patient reports several of the moles on her body have changed in color, one on her upper back left, and also a new mole on the back of her ear.  She reports that she does diligently use sunscreen.  She does have a family history of skin cancer.  She has been followed by dermatologist in the past but has not for several years.  Patient is concerned and would like referral to dermatologist.

## 2019-06-04 ENCOUNTER — APPOINTMENT (OUTPATIENT)
Dept: MEDICAL GROUP | Facility: CLINIC | Age: 44
End: 2019-06-04
Payer: MEDICAID

## 2019-06-13 ENCOUNTER — OFFICE VISIT (OUTPATIENT)
Dept: URGENT CARE | Facility: PHYSICIAN GROUP | Age: 44
End: 2019-06-13
Payer: MEDICAID

## 2019-06-13 VITALS
TEMPERATURE: 99.7 F | DIASTOLIC BLOOD PRESSURE: 86 MMHG | HEART RATE: 74 BPM | OXYGEN SATURATION: 98 % | WEIGHT: 265 LBS | RESPIRATION RATE: 16 BRPM | BODY MASS INDEX: 40.29 KG/M2 | SYSTOLIC BLOOD PRESSURE: 118 MMHG

## 2019-06-13 DIAGNOSIS — T36.95XA ANTIBIOTIC-INDUCED YEAST INFECTION: ICD-10-CM

## 2019-06-13 DIAGNOSIS — L02.91 ABSCESS: ICD-10-CM

## 2019-06-13 DIAGNOSIS — B37.9 ANTIBIOTIC-INDUCED YEAST INFECTION: ICD-10-CM

## 2019-06-13 PROCEDURE — 99214 OFFICE O/P EST MOD 30 MIN: CPT | Performed by: FAMILY MEDICINE

## 2019-06-13 RX ORDER — DOXYCYCLINE HYCLATE 100 MG
100 TABLET ORAL 2 TIMES DAILY
Qty: 20 TAB | Refills: 0 | Status: SHIPPED | OUTPATIENT
Start: 2019-06-13 | End: 2019-06-23

## 2019-06-13 RX ORDER — FLUCONAZOLE 150 MG/1
TABLET ORAL
Qty: 2 TAB | Refills: 0 | Status: SHIPPED | OUTPATIENT
Start: 2019-06-13

## 2019-06-27 DIAGNOSIS — I10 ESSENTIAL HYPERTENSION: ICD-10-CM

## 2019-06-27 RX ORDER — LISINOPRIL 20 MG/1
20 TABLET ORAL DAILY
Qty: 30 TAB | Refills: 2 | Status: SHIPPED | OUTPATIENT
Start: 2019-06-27

## 2019-07-03 ENCOUNTER — NON-PROVIDER VISIT (OUTPATIENT)
Dept: MEDICAL GROUP | Facility: CLINIC | Age: 44
End: 2019-07-03
Payer: MEDICAID

## 2019-07-03 VITALS — SYSTOLIC BLOOD PRESSURE: 140 MMHG | DIASTOLIC BLOOD PRESSURE: 82 MMHG

## 2020-09-18 ENCOUNTER — HOSPITAL ENCOUNTER (OUTPATIENT)
Dept: LAB | Facility: MEDICAL CENTER | Age: 45
End: 2020-09-18
Attending: NURSE PRACTITIONER
Payer: MEDICAID

## 2020-09-18 LAB
25(OH)D3 SERPL-MCNC: 57 NG/ML (ref 30–100)
ALBUMIN SERPL BCP-MCNC: 4.2 G/DL (ref 3.2–4.9)
ALBUMIN/GLOB SERPL: 1.6 G/DL
ALP SERPL-CCNC: 93 U/L (ref 30–99)
ALT SERPL-CCNC: 10 U/L (ref 2–50)
ANION GAP SERPL CALC-SCNC: 11 MMOL/L (ref 7–16)
AST SERPL-CCNC: 19 U/L (ref 12–45)
BASOPHILS # BLD AUTO: 0.4 % (ref 0–1.8)
BASOPHILS # BLD: 0.04 K/UL (ref 0–0.12)
BILIRUB SERPL-MCNC: 0.7 MG/DL (ref 0.1–1.5)
BUN SERPL-MCNC: 14 MG/DL (ref 8–22)
CALCIUM SERPL-MCNC: 9.1 MG/DL (ref 8.5–10.5)
CHLORIDE SERPL-SCNC: 100 MMOL/L (ref 96–112)
CHOLEST SERPL-MCNC: 146 MG/DL (ref 100–199)
CO2 SERPL-SCNC: 25 MMOL/L (ref 20–33)
CREAT SERPL-MCNC: 0.79 MG/DL (ref 0.5–1.4)
EOSINOPHIL # BLD AUTO: 0.18 K/UL (ref 0–0.51)
EOSINOPHIL NFR BLD: 1.8 % (ref 0–6.9)
ERYTHROCYTE [DISTWIDTH] IN BLOOD BY AUTOMATED COUNT: 44.4 FL (ref 35.9–50)
FASTING STATUS PATIENT QL REPORTED: NORMAL
GLOBULIN SER CALC-MCNC: 2.7 G/DL (ref 1.9–3.5)
GLUCOSE SERPL-MCNC: 97 MG/DL (ref 65–99)
HCT VFR BLD AUTO: 49.3 % (ref 37–47)
HDLC SERPL-MCNC: 40 MG/DL
HGB BLD-MCNC: 16.5 G/DL (ref 12–16)
IMM GRANULOCYTES # BLD AUTO: 0.02 K/UL (ref 0–0.11)
IMM GRANULOCYTES NFR BLD AUTO: 0.2 % (ref 0–0.9)
LDLC SERPL CALC-MCNC: 78 MG/DL
LYMPHOCYTES # BLD AUTO: 2.35 K/UL (ref 1–4.8)
LYMPHOCYTES NFR BLD: 23.6 % (ref 22–41)
MCH RBC QN AUTO: 30.8 PG (ref 27–33)
MCHC RBC AUTO-ENTMCNC: 33.5 G/DL (ref 33.6–35)
MCV RBC AUTO: 92.1 FL (ref 81.4–97.8)
MONOCYTES # BLD AUTO: 0.51 K/UL (ref 0–0.85)
MONOCYTES NFR BLD AUTO: 5.1 % (ref 0–13.4)
NEUTROPHILS # BLD AUTO: 6.86 K/UL (ref 2–7.15)
NEUTROPHILS NFR BLD: 68.9 % (ref 44–72)
NRBC # BLD AUTO: 0 K/UL
NRBC BLD-RTO: 0 /100 WBC
PLATELET # BLD AUTO: 319 K/UL (ref 164–446)
PMV BLD AUTO: 9 FL (ref 9–12.9)
POTASSIUM SERPL-SCNC: 4.3 MMOL/L (ref 3.6–5.5)
PROT SERPL-MCNC: 6.9 G/DL (ref 6–8.2)
RBC # BLD AUTO: 5.35 M/UL (ref 4.2–5.4)
SODIUM SERPL-SCNC: 136 MMOL/L (ref 135–145)
T4 FREE SERPL-MCNC: 1.11 NG/DL (ref 0.93–1.7)
TRIGL SERPL-MCNC: 139 MG/DL (ref 0–149)
TSH SERPL DL<=0.005 MIU/L-ACNC: 1.64 UIU/ML (ref 0.38–5.33)
WBC # BLD AUTO: 10 K/UL (ref 4.8–10.8)

## 2020-09-18 PROCEDURE — 80061 LIPID PANEL: CPT

## 2020-09-18 PROCEDURE — 80053 COMPREHEN METABOLIC PANEL: CPT

## 2020-09-18 PROCEDURE — 82306 VITAMIN D 25 HYDROXY: CPT

## 2020-09-18 PROCEDURE — 84439 ASSAY OF FREE THYROXINE: CPT

## 2020-09-18 PROCEDURE — 85025 COMPLETE CBC W/AUTO DIFF WBC: CPT

## 2020-09-18 PROCEDURE — 36415 COLL VENOUS BLD VENIPUNCTURE: CPT

## 2020-09-18 PROCEDURE — 83036 HEMOGLOBIN GLYCOSYLATED A1C: CPT

## 2020-09-18 PROCEDURE — 84443 ASSAY THYROID STIM HORMONE: CPT

## 2020-09-19 LAB
EST. AVERAGE GLUCOSE BLD GHB EST-MCNC: 108 MG/DL
HBA1C MFR BLD: 5.4 % (ref 0–5.6)